# Patient Record
Sex: MALE | Race: WHITE
[De-identification: names, ages, dates, MRNs, and addresses within clinical notes are randomized per-mention and may not be internally consistent; named-entity substitution may affect disease eponyms.]

---

## 2018-03-23 ENCOUNTER — HOSPITAL ENCOUNTER (INPATIENT)
Dept: HOSPITAL 92 - ERS | Age: 33
LOS: 1 days | Discharge: HOME | DRG: 343 | End: 2018-03-24
Attending: SURGERY | Admitting: SURGERY
Payer: COMMERCIAL

## 2018-03-23 VITALS — BODY MASS INDEX: 28 KG/M2

## 2018-03-23 DIAGNOSIS — K35.80: Primary | ICD-10-CM

## 2018-03-23 LAB
ALBUMIN SERPL BCG-MCNC: 4.4 G/DL (ref 3.5–5)
ALP SERPL-CCNC: 45 U/L (ref 40–150)
ALT SERPL W P-5'-P-CCNC: 52 U/L (ref 8–55)
ANION GAP SERPL CALC-SCNC: 14 MMOL/L (ref 10–20)
AST SERPL-CCNC: 45 U/L (ref 5–34)
BASOPHILS # BLD AUTO: 0 THOU/UL (ref 0–0.2)
BASOPHILS NFR BLD AUTO: 0.4 % (ref 0–1)
BILIRUB SERPL-MCNC: 1.3 MG/DL (ref 0.2–1.2)
BUN SERPL-MCNC: 11 MG/DL (ref 8.9–20.6)
CALCIUM SERPL-MCNC: 9.4 MG/DL (ref 7.8–10.44)
CHLORIDE SERPL-SCNC: 102 MMOL/L (ref 98–107)
CO2 SERPL-SCNC: 27 MMOL/L (ref 22–29)
CREAT CL PREDICTED SERPL C-G-VRATE: 0 ML/MIN (ref 70–130)
EOSINOPHIL # BLD AUTO: 0.2 THOU/UL (ref 0–0.7)
EOSINOPHIL NFR BLD AUTO: 1.4 % (ref 0–10)
GLOBULIN SER CALC-MCNC: 3 G/DL (ref 2.4–3.5)
GLUCOSE SERPL-MCNC: 119 MG/DL (ref 70–105)
HGB BLD-MCNC: 17.4 G/DL (ref 14–18)
LIPASE SERPL-CCNC: 13 U/L (ref 8–78)
LYMPHOCYTES # BLD: 1.3 THOU/UL (ref 1.2–3.4)
LYMPHOCYTES NFR BLD AUTO: 10.3 % (ref 21–51)
MCH RBC QN AUTO: 31.6 PG (ref 27–31)
MCV RBC AUTO: 94.7 FL (ref 80–94)
MONOCYTES # BLD AUTO: 1.1 THOU/UL (ref 0.11–0.59)
MONOCYTES NFR BLD AUTO: 8.8 % (ref 0–10)
NEUTROPHILS # BLD AUTO: 10.1 THOU/UL (ref 1.4–6.5)
NEUTROPHILS NFR BLD AUTO: 79.1 % (ref 42–75)
PLATELET # BLD AUTO: 140 THOU/UL (ref 130–400)
POTASSIUM SERPL-SCNC: 4.7 MMOL/L (ref 3.5–5.1)
RBC # BLD AUTO: 5.49 MILL/UL (ref 4.7–6.1)
SODIUM SERPL-SCNC: 138 MMOL/L (ref 136–145)
SP GR UR STRIP: 1.01 (ref 1–1.04)
WBC # BLD AUTO: 12.8 THOU/UL (ref 4.8–10.8)

## 2018-03-23 PROCEDURE — 36416 COLLJ CAPILLARY BLOOD SPEC: CPT

## 2018-03-23 PROCEDURE — 85025 COMPLETE CBC W/AUTO DIFF WBC: CPT

## 2018-03-23 PROCEDURE — 96361 HYDRATE IV INFUSION ADD-ON: CPT

## 2018-03-23 PROCEDURE — 83690 ASSAY OF LIPASE: CPT

## 2018-03-23 PROCEDURE — A4216 STERILE WATER/SALINE, 10 ML: HCPCS

## 2018-03-23 PROCEDURE — 96374 THER/PROPH/DIAG INJ IV PUSH: CPT

## 2018-03-23 PROCEDURE — 96375 TX/PRO/DX INJ NEW DRUG ADDON: CPT

## 2018-03-23 PROCEDURE — 0DTJ4ZZ RESECTION OF APPENDIX, PERCUTANEOUS ENDOSCOPIC APPROACH: ICD-10-PCS | Performed by: SURGERY

## 2018-03-23 PROCEDURE — 88304 TISSUE EXAM BY PATHOLOGIST: CPT

## 2018-03-23 PROCEDURE — 36415 COLL VENOUS BLD VENIPUNCTURE: CPT

## 2018-03-23 PROCEDURE — 81003 URINALYSIS AUTO W/O SCOPE: CPT

## 2018-03-23 PROCEDURE — 80048 BASIC METABOLIC PNL TOTAL CA: CPT

## 2018-03-23 PROCEDURE — 80053 COMPREHEN METABOLIC PANEL: CPT

## 2018-03-23 RX ADMIN — HYDROCODONE BITARTRATE AND ACETAMINOPHEN PRN TAB: 10; 325 TABLET ORAL at 16:24

## 2018-03-23 RX ADMIN — METRONIDAZOLE SCH MLS: 500 INJECTION, SOLUTION INTRAVENOUS at 21:26

## 2018-03-23 RX ADMIN — POTASSIUM CHLORIDE, DEXTROSE MONOHYDRATE AND SODIUM CHLORIDE SCH MLS: 150; 5; 450 INJECTION, SOLUTION INTRAVENOUS at 16:17

## 2018-03-23 NOTE — HP
CHIEF COMPLAINT:  Right lower quadrant abdominal pain.

 

HISTORY OF PRESENT ILLNESS:  The patient is a 32-year-old male with 24-hour history of progressive ri
ght lower quadrant pain associated with nausea, vomiting, chills, fever, and rigors.  No previous epi
sodes.

 

PAST MEDICAL HISTORY:  Otherwise, healthy.

 

PAST SURGICAL HISTORY:  None.

 

MEDICATIONS:  None.

 

ALLERGIES:  He has an allergy to PENICILLIN.

 

SOCIAL HISTORY:  He is single.  He works in IT.  Occasional alcohol, no tobacco.

 

FAMILY HISTORY:  Diabetes.

 

PHYSICAL EXAMINATION:

VITAL SIGNS:  Pulse 109 and blood pressure 130/76.

GENERAL:  He is awake, having chills.

HEENT:  Otherwise, unremarkable.

LUNGS:  Clear.

HEART:  Regular rate and rhythm.

ABDOMEN:  Soft, very tender to percussion in right lower quadrant, no palpable masses.

EXTREMITIES:  Unremarkable.

 

LABORATORY DATA:  White count 12.9.

 

ASSESSMENT:  Acute appendicitis.

 

PLAN:  Laparoscopic appendectomy.

 

CONSENT:  I have discussed the planned procedure as well as risk of bleeding, infection, injury to cecilia
wel, bladder, need to open.  He understands and gives informed consent.

## 2018-03-23 NOTE — OP
PREOPERATIVE DIAGNOSIS:  Acute appendicitis.

 

SURGEON:  Pranav Hernandez M.D.

 

PROCEDURE PERFORMED:  Laparoscopic appendectomy.

 

INDICATIONS:  A 32-year-old male who presented with a 24-hour history of right lower quadrant pain, n
ausea, vomiting, chills, and fever, leukocytosis.

 

FINDINGS:  Acute gangrenous appendicitis.

 

PROCEDURE IN DETAIL:  After informed consent was obtained, the patient was taken to the operating yoana
m and given general endotracheal anesthesia, placed in the supine position.  The abdomen was prepped 
and draped in usual fashion.  Local anesthesia infiltrated subcutaneously and deep.  A subumbilical i
ncision was performed.  The subcu divided sharply.  The fascia grasped two stay sutures of 0 Vicryl p
laced to either side of midline.  Midline incised.  Digital palpation revealed no local adhesions.  A
 blunt 10/12 mm trocar inserted.  Pneumoperitoneum was created to a pressure of 15 mmHg.  Zero degree
 laparoscope inserted under direct vision, two 5 mm ports placed, one suprapubic and one right latera
l abdomen.  The appendix was found.  It was gangrenous, but there is really no purulent fluid around 
it.  The mesoappendix was divided utilizing the LigaSure.  The base of the appendix was divided utili
zing the linear 45-mm white load stapler.  The appendix was placed in an Endosac and removed from the
 abdomen through the umbilical incision and in the Endosac.  The pelvis was inspected.  There was no 
purulent fluid.  The area was irrigated and irrigation fluid removed.  Trocars and retractors removed
.  The fascia closed with interrupted 0 Vicryl suture.  The skin closed with interrupted 4-0 Rapide. 
 Dermabond applied.  The patient tolerated the procedure well and was transferred to recovery in good
 condition.  Sponge and needle count verified correct x2.

## 2018-03-24 VITALS — SYSTOLIC BLOOD PRESSURE: 107 MMHG | DIASTOLIC BLOOD PRESSURE: 42 MMHG

## 2018-03-24 VITALS — TEMPERATURE: 97.8 F

## 2018-03-24 LAB
ANION GAP SERPL CALC-SCNC: 9 MMOL/L (ref 10–20)
BASOPHILS # BLD AUTO: 0 THOU/UL (ref 0–0.2)
BASOPHILS NFR BLD AUTO: 0.1 % (ref 0–1)
BUN SERPL-MCNC: 16 MG/DL (ref 8.9–20.6)
CALCIUM SERPL-MCNC: 8.7 MG/DL (ref 7.8–10.44)
CHLORIDE SERPL-SCNC: 102 MMOL/L (ref 98–107)
CO2 SERPL-SCNC: 28 MMOL/L (ref 22–29)
CREAT CL PREDICTED SERPL C-G-VRATE: 103 ML/MIN (ref 70–130)
EOSINOPHIL # BLD AUTO: 0 THOU/UL (ref 0–0.7)
EOSINOPHIL NFR BLD AUTO: 0.1 % (ref 0–10)
GLUCOSE SERPL-MCNC: 212 MG/DL (ref 70–105)
HGB BLD-MCNC: 14.6 G/DL (ref 14–18)
LYMPHOCYTES # BLD: 0.8 THOU/UL (ref 1.2–3.4)
LYMPHOCYTES NFR BLD AUTO: 5.8 % (ref 21–51)
MCH RBC QN AUTO: 33.8 PG (ref 27–31)
MCV RBC AUTO: 99.2 FL (ref 80–94)
MONOCYTES # BLD AUTO: 0.8 THOU/UL (ref 0.11–0.59)
MONOCYTES NFR BLD AUTO: 6 % (ref 0–10)
NEUTROPHILS # BLD AUTO: 11.3 THOU/UL (ref 1.4–6.5)
NEUTROPHILS NFR BLD AUTO: 88 % (ref 42–75)
PLATELET # BLD AUTO: 104 THOU/UL (ref 130–400)
POTASSIUM SERPL-SCNC: 4.7 MMOL/L (ref 3.5–5.1)
RBC # BLD AUTO: 4.32 MILL/UL (ref 4.7–6.1)
SODIUM SERPL-SCNC: 134 MMOL/L (ref 136–145)
WBC # BLD AUTO: 12.9 THOU/UL (ref 4.8–10.8)

## 2018-03-24 RX ADMIN — METRONIDAZOLE SCH MLS: 500 INJECTION, SOLUTION INTRAVENOUS at 06:52

## 2018-03-24 RX ADMIN — POTASSIUM CHLORIDE, DEXTROSE MONOHYDRATE AND SODIUM CHLORIDE SCH: 150; 5; 450 INJECTION, SOLUTION INTRAVENOUS at 00:20

## 2018-03-24 RX ADMIN — HYDROCODONE BITARTRATE AND ACETAMINOPHEN PRN TAB: 10; 325 TABLET ORAL at 04:29

## 2018-03-24 RX ADMIN — HYDROCODONE BITARTRATE AND ACETAMINOPHEN PRN TAB: 10; 325 TABLET ORAL at 00:23

## 2018-03-24 NOTE — DIS
DISCHARGE DIAGNOSIS:  Acute gangrenous appendicitis.

 

PROCEDURES DURING ADMISSION:  Laparoscopic appendectomy.

 

HOSPITAL COURSE:  The patient was admitted, given IV antibiotics, taken to the operating room where nate moreno underwent a laparoscopic appendectomy.  Postoperatively, he has done well.  He is tolerating a regu
lar diet.  His pain is minimal and well controlled.  He is discharged home on hydrocodone, Zofran.  F
ollow up with me in 2 weeks.